# Patient Record
Sex: FEMALE | URBAN - METROPOLITAN AREA
[De-identification: names, ages, dates, MRNs, and addresses within clinical notes are randomized per-mention and may not be internally consistent; named-entity substitution may affect disease eponyms.]

---

## 2020-06-18 ENCOUNTER — APPOINTMENT (RX ONLY)
Dept: URBAN - METROPOLITAN AREA CLINIC 33 | Facility: CLINIC | Age: 80
Setting detail: DERMATOLOGY
End: 2020-06-18

## 2020-06-18 DIAGNOSIS — L20.89 OTHER ATOPIC DERMATITIS: ICD-10-CM

## 2020-06-18 PROBLEM — L20.84 INTRINSIC (ALLERGIC) ECZEMA: Status: ACTIVE | Noted: 2020-06-18

## 2020-06-18 PROCEDURE — 99202 OFFICE O/P NEW SF 15 MIN: CPT | Mod: 95

## 2020-06-18 PROCEDURE — ? TREATMENT REGIMEN

## 2020-06-18 PROCEDURE — ? PRESCRIPTION

## 2020-06-18 RX ADMIN — TRIAMCINOLONE ACETONIDE: 1 OINTMENT TOPICAL at 00:00

## 2020-06-18 ASSESSMENT — LOCATION ZONE DERM: LOCATION ZONE: NECK

## 2020-06-18 ASSESSMENT — LOCATION SIMPLE DESCRIPTION DERM: LOCATION SIMPLE: TRAPEZIAL NECK

## 2020-06-18 ASSESSMENT — LOCATION DETAILED DESCRIPTION DERM: LOCATION DETAILED: MID TRAPEZIAL NECK

## 2020-06-18 NOTE — PROCEDURE: TREATMENT REGIMEN
Plan: On neck from sheets , gain detergent ? \\n, will used dye free , has new sheets \\nUsing Oil Oaly products \\nStart TAC oint bid x 2-4 weeks \\n\\n
Detail Level: Zone

## 2020-06-18 NOTE — HPI: RASH
How Severe Is Your Rash?: moderate
Is This A New Presentation, Or A Follow-Up?: Rash
Additional History: Probably from bed sheets

## 2020-06-19 RX ORDER — TRIAMCINOLONE ACETONIDE 1 MG/G
OINTMENT TOPICAL
Qty: 1 | Refills: 1 | Status: ERX | COMMUNITY
Start: 2020-06-18

## 2020-07-15 ENCOUNTER — APPOINTMENT (RX ONLY)
Dept: URBAN - METROPOLITAN AREA CLINIC 1 | Facility: CLINIC | Age: 80
Setting detail: DERMATOLOGY
End: 2020-07-15

## 2020-07-15 DIAGNOSIS — L20.89 OTHER ATOPIC DERMATITIS: ICD-10-CM

## 2020-07-15 PROCEDURE — ? PRESCRIPTION

## 2020-07-15 PROCEDURE — ? COUNSELING

## 2020-07-15 PROCEDURE — ? TREATMENT REGIMEN

## 2020-07-15 RX ORDER — TRIAMCINOLONE ACETONIDE 1 MG/G
OINTMENT TOPICAL
Qty: 1 | Refills: 1 | Status: ERX | COMMUNITY
Start: 2020-07-15

## 2020-07-15 RX ADMIN — TRIAMCINOLONE ACETONIDE: 1 OINTMENT TOPICAL at 00:00

## 2020-07-15 ASSESSMENT — LOCATION ZONE DERM: LOCATION ZONE: NECK

## 2020-07-15 ASSESSMENT — LOCATION SIMPLE DESCRIPTION DERM
LOCATION SIMPLE: RIGHT ANTERIOR NECK
LOCATION SIMPLE: LEFT ANTERIOR NECK

## 2020-07-15 ASSESSMENT — LOCATION DETAILED DESCRIPTION DERM
LOCATION DETAILED: LEFT SUPERIOR ANTERIOR NECK
LOCATION DETAILED: RIGHT INFERIOR LATERAL NECK

## 2020-07-15 NOTE — PROCEDURE: TREATMENT REGIMEN
Plan: On neck from sheets , gain detergent ? \\n, will used dye free , has new sheets \\nUsing Oil Oaly products \\nStart TAC oint bid x 2-4 weeks\\n\\n7/15/20\\nPt is allergic to the detergent Gain \\nPt used a bed spread that was washed in Gain Detergent and is currently experiencing a flareup on the neck \\nContinue TAC\\nRefilled today
Detail Level: Zone

## 2020-08-19 ENCOUNTER — APPOINTMENT (RX ONLY)
Dept: URBAN - METROPOLITAN AREA CLINIC 1 | Facility: CLINIC | Age: 80
Setting detail: DERMATOLOGY
End: 2020-08-19

## 2020-08-19 DIAGNOSIS — L20.89 OTHER ATOPIC DERMATITIS: ICD-10-CM

## 2020-08-19 DIAGNOSIS — T07XXXA INSECT BITE, NONVENOMOUS, OF OTHER, MULTIPLE, AND UNSPECIFIED SITES, WITHOUT MENTION OF INFECTION: ICD-10-CM

## 2020-08-19 PROBLEM — L20.84 INTRINSIC (ALLERGIC) ECZEMA: Status: ACTIVE | Noted: 2020-08-19

## 2020-08-19 PROBLEM — S20.369A INSECT BITE (NONVENOMOUS) OF UNSPECIFIED FRONT WALL OF THORAX, INITIAL ENCOUNTER: Status: ACTIVE | Noted: 2020-08-19

## 2020-08-19 PROCEDURE — ? TREATMENT REGIMEN

## 2020-08-19 PROCEDURE — 99213 OFFICE O/P EST LOW 20 MIN: CPT | Mod: 95

## 2020-08-19 PROCEDURE — ? PRESCRIPTION

## 2020-08-19 RX ORDER — BETAMETHASONE DIPROPIONATE 0.5 MG/G
OINTMENT TOPICAL BID
Qty: 3 | Refills: 3 | Status: ERX | COMMUNITY
Start: 2020-08-19

## 2020-08-19 RX ADMIN — BETAMETHASONE DIPROPIONATE: 0.5 OINTMENT TOPICAL at 00:00

## 2020-08-19 ASSESSMENT — LOCATION ZONE DERM: LOCATION ZONE: TRUNK

## 2020-08-19 ASSESSMENT — LOCATION DETAILED DESCRIPTION DERM: LOCATION DETAILED: MIDDLE STERNUM

## 2020-08-19 ASSESSMENT — LOCATION SIMPLE DESCRIPTION DERM: LOCATION SIMPLE: CHEST

## 2020-08-19 NOTE — PROCEDURE: TREATMENT REGIMEN
Plan: Worse , flaring \\nStart Betamethasone oint apply to neck Qpm x 2-4 weeks \\nForget and use gain detergent again , will go back to gentle D/C TAC oint bid \\n\\n
Detail Level: Zone
Plan: On chest \\nBetamethasone I bid x 1 week , then PRn

## 2020-09-17 ENCOUNTER — APPOINTMENT (RX ONLY)
Dept: URBAN - METROPOLITAN AREA CLINIC 33 | Facility: CLINIC | Age: 80
Setting detail: DERMATOLOGY
End: 2020-09-17

## 2020-09-17 DIAGNOSIS — B35.4 TINEA CORPORIS: ICD-10-CM

## 2020-09-17 DIAGNOSIS — T07XXXA INSECT BITE, NONVENOMOUS, OF OTHER, MULTIPLE, AND UNSPECIFIED SITES, WITHOUT MENTION OF INFECTION: ICD-10-CM

## 2020-09-17 PROBLEM — S20.369A INSECT BITE (NONVENOMOUS) OF UNSPECIFIED FRONT WALL OF THORAX, INITIAL ENCOUNTER: Status: ACTIVE | Noted: 2020-09-17

## 2020-09-17 PROCEDURE — ? TREATMENT REGIMEN

## 2020-09-17 PROCEDURE — ? PRESCRIPTION

## 2020-09-17 PROCEDURE — 99213 OFFICE O/P EST LOW 20 MIN: CPT | Mod: 95

## 2020-09-17 RX ORDER — PREDNISONE 20 MG/1
TABLET ORAL
Qty: 10 | Refills: 0 | Status: ERX | COMMUNITY
Start: 2020-09-17

## 2020-09-17 RX ORDER — HALOBETASOL PROPIONATE 0.5 MG/G
OINTMENT TOPICAL BID
Qty: 1 | Refills: 1 | Status: ERX | COMMUNITY
Start: 2020-09-17

## 2020-09-17 RX ORDER — KETOCONAZOLE 20 MG/G
CREAM TOPICAL
Qty: 1 | Refills: 1 | Status: ERX | COMMUNITY
Start: 2020-09-17

## 2020-09-17 RX ADMIN — HALOBETASOL PROPIONATE: 0.5 OINTMENT TOPICAL at 00:00

## 2020-09-17 RX ADMIN — PREDNISONE: 20 TABLET ORAL at 00:00

## 2020-09-17 RX ADMIN — KETOCONAZOLE: 20 CREAM TOPICAL at 00:00

## 2020-09-17 ASSESSMENT — LOCATION SIMPLE DESCRIPTION DERM: LOCATION SIMPLE: CHEST

## 2020-09-17 ASSESSMENT — LOCATION DETAILED DESCRIPTION DERM: LOCATION DETAILED: MIDDLE STERNUM

## 2020-09-17 ASSESSMENT — LOCATION ZONE DERM: LOCATION ZONE: TRUNK

## 2020-09-17 NOTE — PROCEDURE: TREATMENT REGIMEN
Plan: Worse , flaring \\nDiscontinue how late is alloint Bid to affected areas \\nStart prednisone 20 mg 1 po bid x 5 days\\nD/C Betamethasone oint \\nKetoconazole cream bid \\n\\nGRiZ peg 250 twice a day for 30 days\\n\\nFollow in 2 weeks
Detail Level: Zone
Plan: On chest \\nD/C Betamethasone \\nStart halobetasol oint bid x 2 weeks

## 2020-09-19 RX ORDER — ULTRAMICROSIZE GRISEOFULVIN TABLETS 250 MG/1
TABLET, COATED ORAL
Qty: 60 | Refills: 1 | Status: ERX | COMMUNITY
Start: 2020-09-19

## 2020-09-19 RX ADMIN — ULTRAMICROSIZE GRISEOFULVIN TABLETS: 250 TABLET, COATED ORAL at 00:00

## 2020-10-08 ENCOUNTER — APPOINTMENT (RX ONLY)
Dept: URBAN - METROPOLITAN AREA CLINIC 1 | Facility: CLINIC | Age: 80
Setting detail: DERMATOLOGY
End: 2020-10-08

## 2020-10-08 DIAGNOSIS — T07XXXA INSECT BITE, NONVENOMOUS, OF OTHER, MULTIPLE, AND UNSPECIFIED SITES, WITHOUT MENTION OF INFECTION: ICD-10-CM

## 2020-10-08 DIAGNOSIS — B35.4 TINEA CORPORIS: ICD-10-CM | Status: RESOLVING

## 2020-10-08 DIAGNOSIS — L81.0 POSTINFLAMMATORY HYPERPIGMENTATION: ICD-10-CM

## 2020-10-08 PROBLEM — S20.369A INSECT BITE (NONVENOMOUS) OF UNSPECIFIED FRONT WALL OF THORAX, INITIAL ENCOUNTER: Status: ACTIVE | Noted: 2020-10-08

## 2020-10-08 PROCEDURE — 99213 OFFICE O/P EST LOW 20 MIN: CPT

## 2020-10-08 PROCEDURE — ? PRESCRIPTION

## 2020-10-08 PROCEDURE — ? COUNSELING

## 2020-10-08 PROCEDURE — ? TREATMENT REGIMEN

## 2020-10-08 RX ORDER — HYDROQUINONE
POWDER (GRAM) MISCELLANEOUS QHS
Qty: 1 | Refills: 1 | Status: ERX

## 2020-10-08 RX ORDER — KETOCONAZOLE 20 MG/G
CREAM TOPICAL
Qty: 1 | Refills: 1 | Status: ERX

## 2020-10-08 RX ORDER — HYDROQUINONE
POWDER (GRAM) MISCELLANEOUS QHS
Qty: 1 | Refills: 1 | Status: CANCELLED | COMMUNITY
Start: 2020-10-08

## 2020-10-08 RX ADMIN — Medication: at 00:00

## 2020-10-08 ASSESSMENT — LOCATION DETAILED DESCRIPTION DERM
LOCATION DETAILED: RIGHT VENTRAL DISTAL FOREARM
LOCATION DETAILED: MIDDLE STERNUM
LOCATION DETAILED: LEFT ANTERIOR PROXIMAL UPPER ARM

## 2020-10-08 ASSESSMENT — LOCATION SIMPLE DESCRIPTION DERM
LOCATION SIMPLE: LEFT UPPER ARM
LOCATION SIMPLE: RIGHT FOREARM
LOCATION SIMPLE: CHEST

## 2020-10-08 ASSESSMENT — LOCATION ZONE DERM
LOCATION ZONE: ARM
LOCATION ZONE: TRUNK

## 2020-10-08 NOTE — PROCEDURE: TREATMENT REGIMEN
Plan: Worse , flaring \\nDiscontinue how late is alloint Bid to affected areas \\nStart prednisone 20 mg 1 po bid x 5 days\\nD/C Betamethasone oint \\nKetoconazole cream bid \\n\\nGRiZ peg 250 twice a day for 30 days\\n\\nFollow in 2 weeks\\n\\n10/8/20\\nResolving \\nPt successfully finished prednisone \\nContinue with griz peg \\nRefilled ketoconazole cream
Detail Level: Zone
Plan: On chest \\nD/C Betamethasone \\nStart halobetasol oint bid x 2 weeks\\n\\n10/8/20\\nResolving \\nPt believes in she was bit by a spider, which she is allergic too\\nContinue using betamethasone

## 2020-11-05 ENCOUNTER — APPOINTMENT (RX ONLY)
Dept: URBAN - METROPOLITAN AREA CLINIC 33 | Facility: CLINIC | Age: 80
Setting detail: DERMATOLOGY
End: 2020-11-05

## 2020-11-05 ENCOUNTER — RX ONLY (OUTPATIENT)
Age: 80
Setting detail: RX ONLY
End: 2020-11-05

## 2020-11-05 DIAGNOSIS — L81.0 POSTINFLAMMATORY HYPERPIGMENTATION: ICD-10-CM

## 2020-11-05 DIAGNOSIS — T07XXXA INSECT BITE, NONVENOMOUS, OF OTHER, MULTIPLE, AND UNSPECIFIED SITES, WITHOUT MENTION OF INFECTION: ICD-10-CM | Status: RESOLVED

## 2020-11-05 DIAGNOSIS — B35.4 TINEA CORPORIS: ICD-10-CM | Status: RESOLVED

## 2020-11-05 PROBLEM — S20.369A INSECT BITE (NONVENOMOUS) OF UNSPECIFIED FRONT WALL OF THORAX, INITIAL ENCOUNTER: Status: ACTIVE | Noted: 2020-11-05

## 2020-11-05 PROCEDURE — ? COUNSELING

## 2020-11-05 PROCEDURE — 99213 OFFICE O/P EST LOW 20 MIN: CPT

## 2020-11-05 PROCEDURE — ? PRESCRIPTION

## 2020-11-05 PROCEDURE — ? TREATMENT REGIMEN

## 2020-11-05 RX ORDER — ULTRAMICROSIZE GRISEOFULVIN TABLETS 250 MG/1
TABLET, COATED ORAL
Qty: 60 | Refills: 1 | Status: ERX | COMMUNITY
Start: 2020-11-05

## 2020-11-05 RX ORDER — HYDROQUINONE
POWDER (GRAM) MISCELLANEOUS QHS
Qty: 1 | Refills: 1 | Status: ERX

## 2020-11-05 RX ORDER — KETOCONAZOLE 20 MG/G
CREAM TOPICAL
Qty: 1 | Refills: 1 | Status: ERX

## 2020-11-05 ASSESSMENT — LOCATION SIMPLE DESCRIPTION DERM
LOCATION SIMPLE: LEFT UPPER ARM
LOCATION SIMPLE: RIGHT FOREARM
LOCATION SIMPLE: CHEST

## 2020-11-05 ASSESSMENT — LOCATION ZONE DERM
LOCATION ZONE: ARM
LOCATION ZONE: TRUNK

## 2020-11-05 ASSESSMENT — LOCATION DETAILED DESCRIPTION DERM
LOCATION DETAILED: MIDDLE STERNUM
LOCATION DETAILED: LEFT ANTERIOR PROXIMAL UPPER ARM
LOCATION DETAILED: RIGHT VENTRAL DISTAL FOREARM

## 2020-11-05 NOTE — PROCEDURE: TREATMENT REGIMEN
Plan: Worse , flaring \\nDiscontinue how late is alloint Bid to affected areas \\nStart prednisone 20 mg 1 po bid x 5 days\\nD/C Betamethasone oint \\nKetoconazole cream bid \\n\\nGRiZ peg 250 twice a day for 30 days\\n\\nFollow in 2 weeks\\n\\n10/8/20\\nResolving \\nPt successfully finished prednisone \\nContinue with griz peg \\nRefilled ketoconazole cream\\n\\n11/5/20\\nImproved \\nContinue griz peg
Detail Level: Zone
Plan: On chest \\nD/C Betamethasone \\nStart halobetasol oint bid x 2 weeks\\n\\n10/8/20\\nResolving \\nPt believes in she was bit by a spider, which she is allergic too\\nContinue using betamethasone\\n\\n11/5/20\\nCleared
Plan: 11/5/20\\nPatient never received medication, resending

## 2020-12-03 ENCOUNTER — APPOINTMENT (RX ONLY)
Dept: URBAN - METROPOLITAN AREA CLINIC 33 | Facility: CLINIC | Age: 80
Setting detail: DERMATOLOGY
End: 2020-12-03

## 2020-12-03 DIAGNOSIS — T07XXXA INSECT BITE, NONVENOMOUS, OF OTHER, MULTIPLE, AND UNSPECIFIED SITES, WITHOUT MENTION OF INFECTION: ICD-10-CM | Status: RESOLVED

## 2020-12-03 DIAGNOSIS — L81.0 POSTINFLAMMATORY HYPERPIGMENTATION: ICD-10-CM

## 2020-12-03 DIAGNOSIS — B35.4 TINEA CORPORIS: ICD-10-CM

## 2020-12-03 PROBLEM — S20.369A INSECT BITE (NONVENOMOUS) OF UNSPECIFIED FRONT WALL OF THORAX, INITIAL ENCOUNTER: Status: ACTIVE | Noted: 2020-12-03

## 2020-12-03 PROCEDURE — ? ADDITIONAL NOTES

## 2020-12-03 PROCEDURE — ? TREATMENT REGIMEN

## 2020-12-03 PROCEDURE — 99213 OFFICE O/P EST LOW 20 MIN: CPT

## 2020-12-03 PROCEDURE — ? PRESCRIPTION

## 2020-12-03 PROCEDURE — ? COUNSELING

## 2020-12-03 RX ORDER — KETOCONAZOLE 20 MG/G
CREAM TOPICAL
Qty: 1 | Refills: 3 | Status: ERX

## 2020-12-03 ASSESSMENT — LOCATION ZONE DERM
LOCATION ZONE: ARM
LOCATION ZONE: TRUNK

## 2020-12-03 ASSESSMENT — LOCATION DETAILED DESCRIPTION DERM
LOCATION DETAILED: LEFT ANTERIOR PROXIMAL UPPER ARM
LOCATION DETAILED: RIGHT VENTRAL DISTAL FOREARM
LOCATION DETAILED: MIDDLE STERNUM

## 2020-12-03 ASSESSMENT — LOCATION SIMPLE DESCRIPTION DERM
LOCATION SIMPLE: RIGHT FOREARM
LOCATION SIMPLE: LEFT UPPER ARM
LOCATION SIMPLE: CHEST

## 2020-12-03 NOTE — PROCEDURE: TREATMENT REGIMEN
Plan: Worse , flaring \\nDiscontinue how late is alloint Bid to affected areas \\nStart prednisone 20 mg 1 po bid x 5 days\\nD/C Betamethasone oint \\nKetoconazole cream bid \\n\\nGRiZ peg 250 twice a day for 30 days\\n\\nFollow in 2 weeks\\n\\n10/8/20\\nResolving \\nPt successfully finished prednisone \\nContinue with griz peg \\nRefilled ketoconazole cream\\n\\n12/3/20\\nImproved \\nContinue griz peg\\nketoconazole 2 % topical cream mix with TAC \\nDays Supply: 30\\nSig: Apply to affected areas bid x 2-4 weeks
Detail Level: Zone
Continue Regimen: ketoconazole 2 % topical cream to be mixed with TAC \\nDays Supply: 30\\nSig: Apply to affected areas bid x 2-4 weeks
Plan: On chest \\nD/C Betamethasone \\nStart halobetasol oint bid x 2 weeks\\n\\n10/8/20\\nResolving \\nPt believes in she was bit by a spider, which she is allergic too\\nContinue using betamethasone\\n\\n11/5/20\\nCleared
Continue Regimen: hydroquinone (bulk) 98% powder Qhs\\nSig: Mix hydroquinone 8% & vitamin C 5% tretinoin.05%  Apply to dark spots QHS.